# Patient Record
Sex: FEMALE | Race: WHITE
[De-identification: names, ages, dates, MRNs, and addresses within clinical notes are randomized per-mention and may not be internally consistent; named-entity substitution may affect disease eponyms.]

---

## 2022-08-06 ENCOUNTER — NON-APPOINTMENT (OUTPATIENT)
Age: 77
End: 2022-08-06

## 2022-08-09 PROBLEM — Z00.00 ENCOUNTER FOR PREVENTIVE HEALTH EXAMINATION: Status: ACTIVE | Noted: 2022-08-09

## 2022-08-10 ENCOUNTER — APPOINTMENT (OUTPATIENT)
Dept: ORTHOPEDIC SURGERY | Facility: CLINIC | Age: 77
End: 2022-08-10

## 2022-08-18 ENCOUNTER — APPOINTMENT (OUTPATIENT)
Dept: ORTHOPEDIC SURGERY | Facility: CLINIC | Age: 77
End: 2022-08-18

## 2022-08-18 VITALS — WEIGHT: 123 LBS | BODY MASS INDEX: 24.8 KG/M2 | HEIGHT: 59 IN

## 2022-08-18 DIAGNOSIS — Z78.9 OTHER SPECIFIED HEALTH STATUS: ICD-10-CM

## 2022-08-18 DIAGNOSIS — M17.12 UNILATERAL PRIMARY OSTEOARTHRITIS, LEFT KNEE: ICD-10-CM

## 2022-08-18 PROCEDURE — 99204 OFFICE O/P NEW MOD 45 MIN: CPT

## 2022-08-18 PROCEDURE — 73564 X-RAY EXAM KNEE 4 OR MORE: CPT | Mod: LT

## 2022-08-18 NOTE — REVIEW OF SYSTEMS
[Joint Swelling] : joint swelling [Negative] : Heme/Lymph [Joint Pain] : no joint pain [Joint Stiffness] : no joint stiffness

## 2022-08-18 NOTE — PHYSICAL EXAM
[Left] : left knee [NL (0)] : extension 0 degrees [] : no extensor lag [TWNoteComboBox7] : flexion 125 degrees

## 2022-08-18 NOTE — HISTORY OF PRESENT ILLNESS
[3] : 3 [Dull/Aching] : dull/aching [de-identified] : 08/18/2022: 76 yo F pt presents w L knee pain since 8/7/22. Fell onto tile floor. Pt is using a bandage (which she said doesn't help) and is using a cane to walk. Pt states little to no pain, just discomfort. [] : no [FreeTextEntry1] : L knee [FreeTextEntry3] : 8/7/2022 [FreeTextEntry5] : Pt fell [de-identified] : 8/12/2022 [de-identified] : MRI

## 2022-08-18 NOTE — DATA REVIEWED
[MRI] : MRI [Left] : left [Knee] : knee [Report was reviewed and noted in the chart] : The report was reviewed and noted in the chart [I independently reviewed and interpreted images and report] : I independently reviewed and interpreted images and report [I reviewed the films/CD] : I reviewed the films/CD [FreeTextEntry1] : 08.12.22 ( [facility]) \par 1.  Nondisplaced patella fracture.\par 2.  Low-grade partial tear at the medial patellofemoral ligament/retinaculum attachment on the patella.\par 3.  Mild superficial infrapatellar bursitis.\par 4.  Small, mildly complex popliteal cyst with evidence of rupture/leak.\par 5.  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

## 2022-08-18 NOTE — DISCUSSION/SUMMARY
[de-identified] : 77f s/p fall 08.07.22 with nondisplaced patella fracture and djd \par 1) cryotherapy, rest and activity modification\par 2) rx for playmaker brace / reparel sleeve\par 3) rtc 2-3 weeks \par \par Entered by Philomena Hernández acting as scribe.\par

## 2022-08-18 NOTE — IMAGING
[Left] : left knee [AP] : anteroposterior [Lateral] : lateral [[place]] : skyline [AP Standing] : anteroposterior standing [Degenerative change] : Degenerative change [Fracture] : Fracture

## 2022-08-24 ENCOUNTER — APPOINTMENT (OUTPATIENT)
Dept: ORTHOPEDIC SURGERY | Facility: CLINIC | Age: 77
End: 2022-08-24

## 2022-09-08 ENCOUNTER — APPOINTMENT (OUTPATIENT)
Dept: ORTHOPEDIC SURGERY | Facility: CLINIC | Age: 77
End: 2022-09-08

## 2022-09-08 VITALS — HEIGHT: 59 IN | BODY MASS INDEX: 24.8 KG/M2 | WEIGHT: 123 LBS

## 2022-09-08 PROCEDURE — 99213 OFFICE O/P EST LOW 20 MIN: CPT

## 2022-09-08 NOTE — DISCUSSION/SUMMARY
[de-identified] : 77f s/p fall 08.07.22 with nondisplaced patella fracture and djd \par 1) cryotherapy, rest and activity modification\par 2) C/w for playmaker brace / reparel sleeve as tolerated\par 3) Begin HEP/ PT\par 4) rtc 6 wks\par \par progress note by Alex Ozuna PAC\par

## 2022-09-08 NOTE — PHYSICAL EXAM
[NL (0)] : extension 0 degrees [] : no extensor lag [Left] : left knee [AP] : anteroposterior [Lateral] : lateral [[place]] : skyline [AP Standing] : anteroposterior standing [The fracture is in acceptable alignment. There is progression in healing seen] : The fracture is in acceptable alignment. There is progression in healing seen [TWNoteComboBox7] : flexion 125 degrees

## 2022-09-08 NOTE — IMAGING
[Left] : left knee [AP] : anteroposterior [Lateral] : lateral [[place]] : skyline [AP Standing] : anteroposterior standing [Degenerative change] : Degenerative change [Fracture] : Fracture

## 2022-09-08 NOTE — HISTORY OF PRESENT ILLNESS
[2] : 2 [Dull/Aching] : dull/aching [de-identified] : 9/8/22: Here for follow up. Presents in brace. Pain has improved. Would like to get back to her exercising.\par \par 08/18/2022: 78 yo F pt presents w L knee pain since 8/7/22. Fell onto tile floor. Pt is using a bandage (which she said doesn't help) and is using a cane to walk. Pt states little to no pain, just discomfort. [] : no [FreeTextEntry1] : L knee [FreeTextEntry3] : 8/7/2022 [FreeTextEntry5] : Pt fell\par \par 9/8/2022: pt looking to get back into working out [de-identified] : 8/12/2022 [de-identified] : MRI [de-identified] : Knee brace - pt wears it when active

## 2022-10-27 ENCOUNTER — APPOINTMENT (OUTPATIENT)
Dept: ORTHOPEDIC SURGERY | Facility: CLINIC | Age: 77
End: 2022-10-27

## 2022-10-27 VITALS — HEIGHT: 59 IN | WEIGHT: 123 LBS | BODY MASS INDEX: 24.8 KG/M2

## 2022-10-27 DIAGNOSIS — S82.002A UNSPECIFIED FRACTURE OF LEFT PATELLA, INITIAL ENCOUNTER FOR CLOSED FRACTURE: ICD-10-CM

## 2022-10-27 PROCEDURE — 99213 OFFICE O/P EST LOW 20 MIN: CPT

## 2022-10-27 NOTE — IMAGING
[Left] : left knee [AP] : anteroposterior [Lateral] : lateral [[place]] : skyline [AP Standing] : anteroposterior standing [Degenerative change] : Degenerative change [Fracture] : Fracture

## 2022-10-27 NOTE — DISCUSSION/SUMMARY
[de-identified] : 77f s/p fall 08.07.22 with nondisplaced patella fracture and djd - improving\par 1) cryotherapy, rest and activity modification\par 2) c/w hep \par 3) rtc 6 wks\par \par Entered by Philomena Hernández acting as scribe.

## 2022-10-27 NOTE — PHYSICAL EXAM
[NL (0)] : extension 0 degrees [Left] : left knee [AP] : anteroposterior [Lateral] : lateral [[place]] : skyline [AP Standing] : anteroposterior standing [The fracture is in acceptable alignment. There is progression in healing seen] : The fracture is in acceptable alignment. There is progression in healing seen [NL (140)] : flexion 140 degrees [] : no extensor lag [TWNoteComboBox7] : flexion 30 degrees

## 2022-10-27 NOTE — HISTORY OF PRESENT ILLNESS
[1] : 2 [Dull/Aching] : dull/aching [Retired] : Work status: retired [de-identified] : 10/27/22: Here to f/up left knee nondisplaced patella fracture. Doing well. Minimal pain complaints. \par 9/8/22: Here for follow up. Presents in brace. Pain has improved. Would like to get back to her exercising.\par \par 08/18/2022: 76 yo F pt presents w L knee pain since 8/7/22. Fell onto tile floor. Pt is using a bandage (which she said doesn't help) and is using a cane to walk. Pt states little to no pain, just discomfort. [] : no [FreeTextEntry1] : L knee [FreeTextEntry3] : 8/7/2022 [FreeTextEntry5] : VICENTE SÁNCHEZ  is a 77 year female who is here today to follow up on left knee pain, she states she is doing better and pain has improved, she is going to PT which is helping.  [de-identified] : 8/12/2022 [de-identified] : MRI [de-identified] : doing physical therapy

## 2022-12-08 ENCOUNTER — APPOINTMENT (OUTPATIENT)
Dept: ORTHOPEDIC SURGERY | Facility: CLINIC | Age: 77
End: 2022-12-08